# Patient Record
Sex: MALE | Race: WHITE
[De-identification: names, ages, dates, MRNs, and addresses within clinical notes are randomized per-mention and may not be internally consistent; named-entity substitution may affect disease eponyms.]

---

## 2021-04-05 ENCOUNTER — HOSPITAL ENCOUNTER (EMERGENCY)
Dept: HOSPITAL 22 - UTC | Age: 36
Discharge: HOME | End: 2021-04-05
Payer: COMMERCIAL

## 2021-04-05 VITALS
TEMPERATURE: 98.06 F | SYSTOLIC BLOOD PRESSURE: 153 MMHG | DIASTOLIC BLOOD PRESSURE: 87 MMHG | OXYGEN SATURATION: 98 % | HEART RATE: 119 BPM | RESPIRATION RATE: 18 BRPM

## 2021-04-05 VITALS
DIASTOLIC BLOOD PRESSURE: 81 MMHG | TEMPERATURE: 98.3 F | OXYGEN SATURATION: 98 % | SYSTOLIC BLOOD PRESSURE: 149 MMHG | RESPIRATION RATE: 18 BRPM | HEART RATE: 87 BPM

## 2021-04-05 VITALS
SYSTOLIC BLOOD PRESSURE: 138 MMHG | DIASTOLIC BLOOD PRESSURE: 96 MMHG | OXYGEN SATURATION: 97 % | HEART RATE: 104 BPM | RESPIRATION RATE: 20 BRPM

## 2021-04-05 VITALS
HEART RATE: 118 BPM | OXYGEN SATURATION: 100 % | DIASTOLIC BLOOD PRESSURE: 112 MMHG | TEMPERATURE: 97.88 F | RESPIRATION RATE: 14 BRPM | SYSTOLIC BLOOD PRESSURE: 154 MMHG

## 2021-04-05 VITALS
OXYGEN SATURATION: 98 % | DIASTOLIC BLOOD PRESSURE: 83 MMHG | SYSTOLIC BLOOD PRESSURE: 142 MMHG | HEART RATE: 78 BPM | RESPIRATION RATE: 18 BRPM

## 2021-04-05 VITALS — BODY MASS INDEX: 35.9 KG/M2 | BODY MASS INDEX: 36.5 KG/M2

## 2021-04-05 DIAGNOSIS — I10: ICD-10-CM

## 2021-04-05 DIAGNOSIS — Z87.891: ICD-10-CM

## 2021-04-05 DIAGNOSIS — Z88.1: ICD-10-CM

## 2021-04-05 DIAGNOSIS — R20.2: ICD-10-CM

## 2021-04-05 DIAGNOSIS — M54.16: Primary | ICD-10-CM

## 2021-04-05 DIAGNOSIS — Z79.899: ICD-10-CM

## 2021-04-05 PROCEDURE — 72131 CT LUMBAR SPINE W/O DYE: CPT

## 2021-04-05 PROCEDURE — 93971 EXTREMITY STUDY: CPT

## 2021-04-05 PROCEDURE — 99282 EMERGENCY DEPT VISIT SF MDM: CPT

## 2021-04-30 ENCOUNTER — HOSPITAL ENCOUNTER (OUTPATIENT)
Dept: HOSPITAL 22 - PT | Age: 36
Discharge: HOME | End: 2021-04-30
Payer: COMMERCIAL

## 2021-04-30 DIAGNOSIS — M51.36: ICD-10-CM

## 2021-04-30 DIAGNOSIS — M47.16: Primary | ICD-10-CM

## 2021-04-30 PROCEDURE — 97163 PT EVAL HIGH COMPLEX 45 MIN: CPT

## 2021-04-30 PROCEDURE — 97010 HOT OR COLD PACKS THERAPY: CPT

## 2021-04-30 PROCEDURE — 97012 MECHANICAL TRACTION THERAPY: CPT

## 2021-04-30 PROCEDURE — 97014 ELECTRIC STIMULATION THERAPY: CPT

## 2021-04-30 PROCEDURE — G0283 ELEC STIM OTHER THAN WOUND: HCPCS

## 2021-04-30 PROCEDURE — 97110 THERAPEUTIC EXERCISES: CPT

## 2021-08-13 ENCOUNTER — HOSPITAL ENCOUNTER (OUTPATIENT)
Age: 36
End: 2021-08-13
Payer: COMMERCIAL

## 2021-08-13 VITALS — BODY MASS INDEX: 24.3 KG/M2

## 2021-08-13 DIAGNOSIS — M54.5: Primary | ICD-10-CM

## 2022-06-15 ENCOUNTER — OFFICE VISIT (OUTPATIENT)
Dept: FAMILY MEDICINE CLINIC | Facility: CLINIC | Age: 37
End: 2022-06-15

## 2022-06-15 VITALS
OXYGEN SATURATION: 98 % | HEIGHT: 69 IN | WEIGHT: 271 LBS | RESPIRATION RATE: 20 BRPM | BODY MASS INDEX: 40.14 KG/M2 | HEART RATE: 90 BPM | SYSTOLIC BLOOD PRESSURE: 140 MMHG | TEMPERATURE: 98.4 F | DIASTOLIC BLOOD PRESSURE: 95 MMHG

## 2022-06-15 DIAGNOSIS — L85.9 HYPERKERATOTIC HAND DERMATITIS: ICD-10-CM

## 2022-06-15 DIAGNOSIS — S39.012A STRAIN OF LUMBAR REGION, INITIAL ENCOUNTER: Primary | ICD-10-CM

## 2022-06-15 PROBLEM — E66.01 CLASS 3 SEVERE OBESITY DUE TO EXCESS CALORIES WITHOUT SERIOUS COMORBIDITY WITH BODY MASS INDEX (BMI) OF 40.0 TO 44.9 IN ADULT: Status: ACTIVE | Noted: 2022-06-15

## 2022-06-15 PROBLEM — E66.813 CLASS 3 SEVERE OBESITY DUE TO EXCESS CALORIES WITHOUT SERIOUS COMORBIDITY WITH BODY MASS INDEX (BMI) OF 40.0 TO 44.9 IN ADULT: Status: ACTIVE | Noted: 2022-06-15

## 2022-06-15 PROCEDURE — 99203 OFFICE O/P NEW LOW 30 MIN: CPT | Performed by: FAMILY MEDICINE

## 2022-06-15 RX ORDER — NAPROXEN 500 MG/1
TABLET ORAL
COMMUNITY
Start: 2022-04-04 | End: 2022-06-15 | Stop reason: SDUPTHER

## 2022-06-15 RX ORDER — CYCLOBENZAPRINE HCL 10 MG
10 TABLET ORAL 3 TIMES DAILY
Qty: 30 TABLET | Refills: 3 | Status: SHIPPED | OUTPATIENT
Start: 2022-06-15

## 2022-06-15 RX ORDER — SILDENAFIL 25 MG/1
25 TABLET, FILM COATED ORAL DAILY PRN
COMMUNITY
Start: 2022-05-13 | End: 2022-10-05 | Stop reason: SDUPTHER

## 2022-06-15 RX ORDER — CYCLOBENZAPRINE HCL 10 MG
TABLET ORAL
COMMUNITY
Start: 2022-04-04 | End: 2022-06-15 | Stop reason: SDUPTHER

## 2022-06-15 RX ORDER — NAPROXEN 500 MG/1
500 TABLET ORAL 2 TIMES DAILY WITH MEALS
Qty: 60 TABLET | Refills: 3 | Status: SHIPPED | OUTPATIENT
Start: 2022-06-15

## 2022-06-15 NOTE — PROGRESS NOTES
"Chief Complaint   Patient presents with   • NP / establish care    • Hypertension     Pt unsure of the name of his medication, he thinks it's Lisnopril 10mg    • low to mid back pain      Flared up again yesterday        Subjective      Matthew Zhou is a 36 y.o. who presents for recurrence of low back pain and lumbar strain. IN the past patient has had radicular symptoms. Pain started yesterday in left lower back after being twisted in an awkward position for a long period of time. Pain is non radiating. He is out of NSAIDS and muscle relaxants. In the past when pain is severe he has required prn Percocet.     He also has hand dermatitis that is unresponsive to topicals. He has seen [facility] in the past and did well with Dupixent     Objective   Vital Signs:  /95   Pulse 90   Temp 98.4 °F (36.9 °C)   Resp 20   Ht 175.3 cm (69\")   Wt 123 kg (271 lb)   SpO2 98%   BMI 40.02 kg/m²     Class 3 Severe Obesity (BMI >=40). Obesity-related health conditions include the following: none. Obesity is unchanged. BMI is is above average; BMI management plan is completed. We discussed portion control and increasing exercise.        Physical Exam  Musculoskeletal:      Lumbar back: Spasms and tenderness present. No swelling, edema, deformity, signs of trauma, lacerations or bony tenderness. Decreased range of motion. Negative right straight leg raise test and negative left straight leg raise test. No scoliosis.        Back:       Comments: TTP left lower myofascia   Skin:     Comments: Thickened hyperkeratotic skin of palms          Result Review                     Assessment and Plan  Diagnoses and all orders for this visit:    1. Strain of lumbar region, initial encounter (Primary)  -     cyclobenzaprine (FLEXERIL) 10 MG tablet; Take 1 tablet by mouth 3 (Three) Times a Day.  Dispense: 30 tablet; Refill: 3  -     naproxen (NAPROSYN) 500 MG tablet; Take 1 tablet by mouth 2 (Two) Times a Day With Meals.  Dispense: 60 " tablet; Refill: 3    2. Hyperkeratotic hand dermatitis  -     Ambulatory Referral to Dermatology            Follow Up  Return if symptoms worsen or fail to improve.  Patient was given instructions and counseling regarding his condition or for health maintenance advice. Please see specific information pulled into the AVS if appropriate.

## 2022-07-27 ENCOUNTER — OFFICE VISIT (OUTPATIENT)
Dept: FAMILY MEDICINE CLINIC | Facility: CLINIC | Age: 37
End: 2022-07-27

## 2022-07-27 VITALS
OXYGEN SATURATION: 98 % | DIASTOLIC BLOOD PRESSURE: 100 MMHG | HEART RATE: 101 BPM | HEIGHT: 69 IN | RESPIRATION RATE: 20 BRPM | TEMPERATURE: 98.2 F | SYSTOLIC BLOOD PRESSURE: 140 MMHG | WEIGHT: 272 LBS | BODY MASS INDEX: 40.29 KG/M2

## 2022-07-27 DIAGNOSIS — M51.36 DEGENERATIVE DISC DISEASE, LUMBAR: ICD-10-CM

## 2022-07-27 DIAGNOSIS — S39.012A STRAIN OF LUMBAR REGION, INITIAL ENCOUNTER: Primary | ICD-10-CM

## 2022-07-27 PROCEDURE — 99213 OFFICE O/P EST LOW 20 MIN: CPT | Performed by: FAMILY MEDICINE

## 2022-07-27 PROCEDURE — 96372 THER/PROPH/DIAG INJ SC/IM: CPT | Performed by: FAMILY MEDICINE

## 2022-07-27 RX ORDER — METHYLPREDNISOLONE 4 MG/1
TABLET ORAL
Qty: 21 TABLET | Refills: 0 | Status: SHIPPED | OUTPATIENT
Start: 2022-07-27

## 2022-07-27 RX ORDER — OXYCODONE HYDROCHLORIDE AND ACETAMINOPHEN 5; 325 MG/1; MG/1
1 TABLET ORAL EVERY 6 HOURS PRN
Qty: 15 TABLET | Refills: 0 | Status: SHIPPED | OUTPATIENT
Start: 2022-07-27

## 2022-07-27 RX ORDER — TRIAMCINOLONE ACETONIDE 40 MG/ML
40 INJECTION, SUSPENSION INTRA-ARTICULAR; INTRAMUSCULAR ONCE
Status: COMPLETED | OUTPATIENT
Start: 2022-07-27 | End: 2022-07-27

## 2022-07-27 RX ADMIN — TRIAMCINOLONE ACETONIDE 40 MG: 40 INJECTION, SUSPENSION INTRA-ARTICULAR; INTRAMUSCULAR at 11:16

## 2022-07-27 NOTE — PROGRESS NOTES
"Chief Complaint   Patient presents with   • low back pain / spasm      Started this morning / leaves for vacation in the morning        Subjective      Matthew Zhou is a 36 y.o. who presents for acute onset low back pain. This is a recurring problem that patient states is happening more frequently. He has a history of lumbar disc disease and displacement. Today pain began after picking up an object. Pain is localized to the low back. No radiculopathy or leg weakness.    The following portions of the patient's history were reviewed and updated as appropriate: current medications, past medical history and problem list.    Review of Systems    Objective   Vital Signs:  /100   Pulse 101   Temp 98.2 °F (36.8 °C)   Resp 20   Ht 175.3 cm (69.02\")   Wt 123 kg (272 lb)   SpO2 98%   BMI 40.15 kg/m²     Class 3 Severe Obesity (BMI >=40). Obesity-related health conditions include the following: Low back pain. Obesity is worsening. BMI is is above average; BMI management plan is completed. We discussed portion control.        Physical Exam  Constitutional:       General: He is in acute distress.      Appearance: He is not ill-appearing.   Musculoskeletal:      Lumbar back: Spasms and tenderness present. No swelling, edema, deformity, signs of trauma or bony tenderness. Decreased range of motion. Negative right straight leg raise test and negative left straight leg raise test.      Comments: TTP Right lumbar paraspinals and QL. Pain with lumbar extension   Neurological:      Mental Status: He is alert.          Result Review                     Assessment and Plan  Diagnoses and all orders for this visit:    1. Strain of lumbar region, initial encounter (Primary)  -     oxyCODONE-acetaminophen (Percocet) 5-325 MG per tablet; Take 1 tablet by mouth Every 6 (Six) Hours As Needed for Severe Pain .  Dispense: 15 tablet; Refill: 0  -     triamcinolone acetonide (KENALOG-40) injection 40 mg    2. Degenerative disc " Gilda,    All of the labs were normal or acceptable.    Please contact my office if you have questions.    Gris Uribe M.D.       disease, lumbar  -     methylPREDNISolone (MEDROL) 4 MG dose pack; Take as directed on package instructions.  Dispense: 21 tablet; Refill: 0  -     oxyCODONE-acetaminophen (Percocet) 5-325 MG per tablet; Take 1 tablet by mouth Every 6 (Six) Hours As Needed for Severe Pain .  Dispense: 15 tablet; Refill: 0  -     Ambulatory Referral to Pain Management  -     triamcinolone acetonide (KENALOG-40) injection 40 mg            Follow Up  No follow-ups on file.  Patient was given instructions and counseling regarding his condition or for health maintenance advice. Please see specific information pulled into the AVS if appropriate.

## 2022-08-19 ENCOUNTER — HOSPITAL ENCOUNTER (OUTPATIENT)
Age: 37
End: 2022-08-19
Payer: COMMERCIAL

## 2022-08-19 VITALS
OXYGEN SATURATION: 100 % | TEMPERATURE: 98.42 F | DIASTOLIC BLOOD PRESSURE: 98 MMHG | SYSTOLIC BLOOD PRESSURE: 147 MMHG | RESPIRATION RATE: 18 BRPM | HEART RATE: 103 BPM

## 2022-08-19 VITALS — BODY MASS INDEX: 36.6 KG/M2

## 2022-08-19 DIAGNOSIS — M54.50: Primary | ICD-10-CM

## 2022-08-19 PROCEDURE — G0463 HOSPITAL OUTPT CLINIC VISIT: HCPCS

## 2022-08-19 PROCEDURE — 99202 OFFICE O/P NEW SF 15 MIN: CPT

## 2022-10-03 RX ORDER — LISINOPRIL 10 MG/1
TABLET ORAL
Qty: 30 TABLET | Refills: 5 | Status: SHIPPED | OUTPATIENT
Start: 2022-10-03

## 2022-10-05 NOTE — TELEPHONE ENCOUNTER
Caller: Matthew Zhou    Relationship: Self    Best call back number: [phone]    Requested Prescriptions:   Requested Prescriptions     Pending Prescriptions Disp Refills   • sildenafil (VIAGRA) 25 MG tablet       Sig: Take 1 tablet by mouth Daily As Needed.        Pharmacy where request should be sent: [id number] - [place], [place] - [address] AT [address] [facility] - [phone]  - [phone] FX     Additional details provided by patient: PATIENT CALLED TO GET A REFILL FROM THE PHARMACY BUT THE AUTOMATED SYSTEM STATED THE DESCRIPTION NUMBER DID NOT MATCH. PATIENT IS OUT OF THIS MEDICATION AND NEEDS A REFILL ASAP     Does the patient have less than a 3 day supply:  [x] Yes  [] No    Sincere Vega Rep   10/05/22 08:22 EDT

## 2022-10-06 RX ORDER — SILDENAFIL 25 MG/1
25 TABLET, FILM COATED ORAL DAILY PRN
Qty: 30 TABLET | Refills: 5 | Status: SHIPPED | OUTPATIENT
Start: 2022-10-06

## 2024-02-14 RX ORDER — LISINOPRIL 10 MG/1
TABLET ORAL
Qty: 30 TABLET | Refills: 5 | OUTPATIENT
Start: 2024-02-14

## 2024-05-12 ENCOUNTER — HOSPITAL ENCOUNTER (EMERGENCY)
Age: 39
Discharge: HOME | End: 2024-05-12
Payer: COMMERCIAL

## 2024-05-12 VITALS
TEMPERATURE: 98.24 F | DIASTOLIC BLOOD PRESSURE: 68 MMHG | OXYGEN SATURATION: 97 % | RESPIRATION RATE: 16 BRPM | HEART RATE: 82 BPM | SYSTOLIC BLOOD PRESSURE: 110 MMHG

## 2024-05-12 VITALS — OXYGEN SATURATION: 97 % | DIASTOLIC BLOOD PRESSURE: 81 MMHG | HEART RATE: 126 BPM | SYSTOLIC BLOOD PRESSURE: 118 MMHG

## 2024-05-12 VITALS
DIASTOLIC BLOOD PRESSURE: 86 MMHG | HEART RATE: 90 BPM | SYSTOLIC BLOOD PRESSURE: 118 MMHG | RESPIRATION RATE: 20 BRPM | TEMPERATURE: 98.3 F | OXYGEN SATURATION: 97 %

## 2024-05-12 VITALS — BODY MASS INDEX: 38.7 KG/M2

## 2024-05-12 VITALS
DIASTOLIC BLOOD PRESSURE: 82 MMHG | TEMPERATURE: 98.24 F | RESPIRATION RATE: 20 BRPM | SYSTOLIC BLOOD PRESSURE: 173 MMHG | OXYGEN SATURATION: 98 % | HEART RATE: 131 BPM

## 2024-05-12 VITALS — SYSTOLIC BLOOD PRESSURE: 110 MMHG | HEART RATE: 103 BPM | OXYGEN SATURATION: 97 % | DIASTOLIC BLOOD PRESSURE: 68 MMHG

## 2024-05-12 VITALS
OXYGEN SATURATION: 97 % | DIASTOLIC BLOOD PRESSURE: 71 MMHG | HEART RATE: 107 BPM | TEMPERATURE: 98.2 F | SYSTOLIC BLOOD PRESSURE: 123 MMHG | RESPIRATION RATE: 20 BRPM

## 2024-05-12 VITALS — HEART RATE: 102 BPM | SYSTOLIC BLOOD PRESSURE: 123 MMHG | DIASTOLIC BLOOD PRESSURE: 71 MMHG | OXYGEN SATURATION: 96 %

## 2024-05-12 DIAGNOSIS — M54.42: Primary | ICD-10-CM

## 2024-05-12 DIAGNOSIS — M62.830: ICD-10-CM

## 2024-05-12 DIAGNOSIS — M53.86: Primary | ICD-10-CM

## 2024-05-12 PROCEDURE — 99283 EMERGENCY DEPT VISIT LOW MDM: CPT

## 2024-05-12 PROCEDURE — 96374 THER/PROPH/DIAG INJ IV PUSH: CPT

## 2024-05-12 PROCEDURE — 99284 EMERGENCY DEPT VISIT MOD MDM: CPT

## 2024-05-12 PROCEDURE — 96375 TX/PRO/DX INJ NEW DRUG ADDON: CPT

## 2024-05-12 PROCEDURE — 96372 THER/PROPH/DIAG INJ SC/IM: CPT

## 2024-05-13 ENCOUNTER — HOSPITAL ENCOUNTER (EMERGENCY)
Facility: HOSPITAL | Age: 39
Discharge: HOME | End: 2024-05-13
Attending: EMERGENCY MEDICINE | Admitting: EMERGENCY MEDICINE
Payer: COMMERCIAL

## 2024-05-13 ENCOUNTER — TELEPHONE (OUTPATIENT)
Dept: FAMILY MEDICINE CLINIC | Facility: CLINIC | Age: 39
End: 2024-05-13
Payer: COMMERCIAL

## 2024-05-13 VITALS
HEIGHT: 68 IN | TEMPERATURE: 98.3 F | BODY MASS INDEX: 40.92 KG/M2 | OXYGEN SATURATION: 96 % | RESPIRATION RATE: 18 BRPM | SYSTOLIC BLOOD PRESSURE: 172 MMHG | WEIGHT: 270 LBS | DIASTOLIC BLOOD PRESSURE: 108 MMHG | HEART RATE: 99 BPM

## 2024-05-13 DIAGNOSIS — M54.30 ACUTE SCIATICA: Primary | ICD-10-CM

## 2024-05-13 PROCEDURE — 99283 EMERGENCY DEPT VISIT LOW MDM: CPT

## 2024-05-13 PROCEDURE — 25010000002 HYDROMORPHONE 1 MG/ML SOLUTION: Performed by: EMERGENCY MEDICINE

## 2024-05-13 PROCEDURE — 96372 THER/PROPH/DIAG INJ SC/IM: CPT

## 2024-05-13 PROCEDURE — 63710000001 ONDANSETRON ODT 4 MG TABLET DISPERSIBLE: Performed by: EMERGENCY MEDICINE

## 2024-05-13 RX ORDER — PREDNISONE 20 MG/1
TABLET ORAL
Qty: 18 TABLET | Refills: 0 | Status: SHIPPED | OUTPATIENT
Start: 2024-05-13

## 2024-05-13 RX ORDER — ONDANSETRON 4 MG/1
4 TABLET, ORALLY DISINTEGRATING ORAL ONCE
Status: COMPLETED | OUTPATIENT
Start: 2024-05-13 | End: 2024-05-13

## 2024-05-13 RX ADMIN — ONDANSETRON 4 MG: 4 TABLET, ORALLY DISINTEGRATING ORAL at 15:15

## 2024-05-13 RX ADMIN — HYDROMORPHONE HYDROCHLORIDE 1 MG: 1 INJECTION, SOLUTION INTRAMUSCULAR; INTRAVENOUS; SUBCUTANEOUS at 15:15

## 2024-05-13 NOTE — TELEPHONE ENCOUNTER
Caller: CHAPARRO SANTORO    Relationship: Emergency Contact    Best call back number: [phone]     What is the best time to reach you: ANY    Who are you requesting to speak with (clinical staff, provider,  specific staff member): CLINICAL STAFF    Do you know the name of the person who called: CHAPARRO    What was the call regarding:   PATIENT'S WIFE IS REQUESTING FOR DR MICHAEL TO PLACE A STAT ORDER FOR AN MRI TO IMAGE HIS LEG(S).     THEY ARE CURRENTLY IN THE EMERGENCY ROOM AND WERE ADVISED BY THE HOSPITAL DOCTOR THAT THEY CAN ONLY DO URGENT MRI'S FOR SERIOUS SITUATIONS SUCH AS FOR STROKE VICTIMS.    SHE STATED THAT THEY TRAVELLED FROM [place] AND WOULD LIKE TO HAVE THIS PERFORMED TODAY IF AT ALL POSSIBLE.    PLEASE ADVISE

## 2024-05-13 NOTE — ED PROVIDER NOTES
Subjective   History of Present Illness  30-year-old male presents emergency department today with a left sciatica.  Patient reports has a longstanding history of back problems and usually sees a chiropractor and has seen Dr. Acevedo in the past but never had to have surgery.  He had MRIs in the past and was hoping to get an MRI today because over the past 24 to 40 hours his pain has gotten worse.  He had no fall no trauma.  Denies any loss of bowel or bladder control.  No saddle anesthesia.  Denies any dysuria frequency urgency or hematuria.    History provided by:  Patient   used: No    Sciatica  Location:  Left buttock rating down the left leg.  Quality:  Shooting burning  Severity:  Severe  Duration:  3 days  Timing:  Constant  Chronicity:  Recurrent  Context:  Longstanding history of back problems previous sciatica.  No previous surgeries.  Relieved by:  Lying flat almost pain-free.  Worsened by:  Sitting up greatly exacerbates his pain.  Associated symptoms: no abdominal pain, no chest pain, no congestion, no cough, no diarrhea, no ear pain, no fatigue, no fever, no headaches, no loss of consciousness, no myalgias, no nausea, no rash, no rhinorrhea, no sore throat, no vomiting and no wheezing        Review of Systems   Constitutional:  Negative for fatigue and fever.   HENT:  Negative for congestion, ear pain, rhinorrhea and sore throat.    Respiratory:  Negative for cough and wheezing.    Cardiovascular:  Negative for chest pain.   Gastrointestinal:  Negative for abdominal pain, diarrhea, nausea and vomiting.   Musculoskeletal:  Negative for myalgias.   Skin:  Negative for rash.   Neurological:  Negative for loss of consciousness and headaches.       No past medical history on file.    No Known Allergies    No past surgical history on file.    No family history on file.    Social History     Socioeconomic History   • Marital status:    Tobacco Use   • Smoking status: Former     Current  packs/day: 0.00     Types: Cigarettes     Quit date: 2014     Years since quitting: 10.3   • Smokeless tobacco: Never   Vaping Use   • Vaping status: Never Used   Substance and Sexual Activity   • Alcohol use: Yes   • Drug use: Never   • Sexual activity: Defer           Objective   Physical Exam  Vitals and nursing note reviewed.   Constitutional:       Appearance: He is well-developed.   HENT:      Head: Normocephalic and atraumatic.      Right Ear: External ear normal.      Left Ear: External ear normal.      Nose: Nose normal.   Eyes:      General: No scleral icterus.     Conjunctiva/sclera: Conjunctivae normal.   Neck:      Thyroid: No thyromegaly.   Pulmonary:      Effort: Pulmonary effort is normal. No respiratory distress.   Abdominal:      Palpations: Abdomen is soft.   Musculoskeletal:      Cervical back: Normal range of motion.      Comments: Left sciatica tender there is no rash no lesions.  Positive straight leg raise on the left.  Strength 5/5.  Skin is warm pink and dry.   Lymphadenopathy:      Cervical: No cervical adenopathy.   Skin:     General: Skin is warm and dry.   Neurological:      Mental Status: He is alert and oriented to person, place, and time.      Cranial Nerves: No cranial nerve deficit.      Coordination: Coordination normal.      Deep Tendon Reflexes: Reflexes are normal and symmetric. Reflexes normal.   Psychiatric:         Behavior: Behavior normal.         Thought Content: Thought content normal.         Judgment: Judgment normal.       Procedures           ED Course  ED Course as of 05/13/24 1454   Mon May 13, 2024   1453 Is requesting an MRI.  We discussed he did not read the emergent medical needs for an MRI in the emergency department.  He will be given a shot of Dilaudid here.  Started on a tapering dose of prednisone.  He has muscle relaxers at home.  Advised him to follow-up with his primary care or Dr. Acevedo so they can get an outpatient MRI scheduled. [RANDAL]      ED Course  User Index  [RANDAL] Angel Mendes PA                                             Medical Decision Making  Risk  Prescription drug management.        Final diagnoses:   Acute sciatica       ED Disposition  ED Disposition       ED Disposition   Discharge    Condition   Stable    Comment   --               Farhad Aviles MD  [address] [facility] [address] [phone]      Call for appointment         Medication List        New Prescriptions      predniSONE 20 MG tablet  Commonly known as: DELTASONE  60 mg p.o. daily for 3 days then 40 mg p.o. daily for 3 days then 20 mg p.o. daily for 3 days               Where to Get Your Medications        These medications were sent to [facility] - [place] - [address] [place] [address] [address] [facility] [address] - [phone]  - [phone] [place] [address] [address] [facility], [place] [address]      Phone: [phone]   predniSONE 20 MG tablet            Angel Mendes PA  05/13/24 9240

## 2024-08-22 ENCOUNTER — HOSPITAL ENCOUNTER (EMERGENCY)
Age: 39
Discharge: HOME | End: 2024-08-22
Payer: COMMERCIAL

## 2024-08-22 VITALS
DIASTOLIC BLOOD PRESSURE: 96 MMHG | OXYGEN SATURATION: 99 % | RESPIRATION RATE: 20 BRPM | HEART RATE: 99 BPM | SYSTOLIC BLOOD PRESSURE: 155 MMHG | TEMPERATURE: 98.42 F

## 2024-08-22 VITALS
SYSTOLIC BLOOD PRESSURE: 155 MMHG | TEMPERATURE: 98.42 F | RESPIRATION RATE: 18 BRPM | HEART RATE: 99 BPM | OXYGEN SATURATION: 99 % | DIASTOLIC BLOOD PRESSURE: 96 MMHG

## 2024-08-22 VITALS — BODY MASS INDEX: 38 KG/M2

## 2024-08-22 DIAGNOSIS — R07.0: ICD-10-CM

## 2024-08-22 DIAGNOSIS — J02.0: Primary | ICD-10-CM

## 2024-08-22 DIAGNOSIS — R06.2: ICD-10-CM

## 2024-08-22 DIAGNOSIS — R05.9: ICD-10-CM

## 2024-08-22 DIAGNOSIS — J20.9: ICD-10-CM

## 2024-08-22 PROCEDURE — 99212 OFFICE O/P EST SF 10 MIN: CPT

## 2024-08-22 PROCEDURE — 96372 THER/PROPH/DIAG INJ SC/IM: CPT

## 2024-08-22 PROCEDURE — G0463 HOSPITAL OUTPT CLINIC VISIT: HCPCS

## 2024-08-22 PROCEDURE — 87880 STREP A ASSAY W/OPTIC: CPT

## 2024-08-22 PROCEDURE — 99204 OFFICE O/P NEW MOD 45 MIN: CPT

## 2025-06-05 NOTE — Clinical Note
[facility] EMERGENCY DEPARTMENT  [address]  [facility] [address]  Phone: [phone]    Matthew Zhou was seen and treated in our emergency department on 5/13/2024.  He may return to work on 05/16/2024.         Thank you for choosing [facility].    Angel Mendes PA      
no

## 2025-07-25 ENCOUNTER — OFFICE VISIT (OUTPATIENT)
Dept: FAMILY MEDICINE CLINIC | Facility: CLINIC | Age: 40
End: 2025-07-25
Payer: COMMERCIAL

## 2025-07-25 VITALS
HEART RATE: 95 BPM | BODY MASS INDEX: 40.69 KG/M2 | OXYGEN SATURATION: 99 % | SYSTOLIC BLOOD PRESSURE: 140 MMHG | WEIGHT: 284.2 LBS | RESPIRATION RATE: 20 BRPM | HEIGHT: 70 IN | TEMPERATURE: 98.1 F | DIASTOLIC BLOOD PRESSURE: 82 MMHG

## 2025-07-25 DIAGNOSIS — G47.8 UNREFRESHED BY SLEEP: ICD-10-CM

## 2025-07-25 DIAGNOSIS — I10 PRIMARY HYPERTENSION: ICD-10-CM

## 2025-07-25 DIAGNOSIS — G47.10 HYPERSOMNOLENCE: Primary | ICD-10-CM

## 2025-07-25 DIAGNOSIS — R06.83 LOUD SNORING: ICD-10-CM

## 2025-07-25 DIAGNOSIS — E66.813 CLASS 3 SEVERE OBESITY DUE TO EXCESS CALORIES WITHOUT SERIOUS COMORBIDITY WITH BODY MASS INDEX (BMI) OF 40.0 TO 44.9 IN ADULT: ICD-10-CM

## 2025-07-25 RX ORDER — GABAPENTIN 400 MG/1
CAPSULE ORAL
COMMUNITY

## 2025-07-25 RX ORDER — LISINOPRIL 10 MG/1
10 TABLET ORAL DAILY
Qty: 30 TABLET | Refills: 5 | Status: SHIPPED | OUTPATIENT
Start: 2025-07-25

## 2025-07-25 RX ORDER — CYCLOBENZAPRINE HCL 5 MG
TABLET ORAL
COMMUNITY

## 2025-07-25 NOTE — PROGRESS NOTES
"Chief Complaint   Patient presents with    Daytime Sleepiness    Fatigue    Re-establish care     Hypertension     Pt has not had his BP meds for two months.        Subjective      Matthew Zhou is a 39 y.o. who presents for a visit to reestablish care with concerns over lack of energy and hypersomnolence.  He saw his previous PCP 2 months ago and reports normal labs.  He has been out of his antihypertensives for the last 2 months.  Patient will sleep anywhere from 5-1/2 hours per night during the week to 12 hours on the weekend.  Regardless of the amount of sleep he gets he never feels rested.  He snores loudly but his wife has not witnessed any apnea.  Symptoms have progressed so severely that he has had trouble staying awake while driving stopped at a red light.  He has also noticed a significant decline in his desire to have sex as well as his ability to obtain an erection.  Patient's father had sleep apnea.  He recognizes that he carries excess weight and has recently begun to make dietary changes primarily in the form of nonsugar he drinks in a way to lose weight.    The following portions of the patient's history were reviewed and updated as appropriate: allergies, current medications, past family history, past medical history, past social history, past surgical history, and problem list.    Review of Systems    Objective   Vital Signs:  /82   Pulse 95   Temp 98.1 °F (36.7 °C)   Resp 20   Ht 177.8 cm (70\")   Wt 129 kg (284 lb 3.2 oz)   SpO2 99%   BMI 40.78 kg/m²             Physical Exam  Constitutional:       Appearance: Normal appearance.   HENT:      Head: Normocephalic and atraumatic.      Right Ear: Tympanic membrane and ear canal normal.      Left Ear: Tympanic membrane and ear canal normal.      Nose: Nose normal.      Mouth/Throat:      Mouth: Mucous membranes are moist.      Pharynx: Oropharynx is clear.      Comments: Mallampati 3  Eyes:      Conjunctiva/sclera: Conjunctivae normal. "   Neck:      Comments: Neck is 21 inches in circumference  Cardiovascular:      Rate and Rhythm: Normal rate and regular rhythm.      Heart sounds: Normal heart sounds. No murmur heard.  Pulmonary:      Effort: Pulmonary effort is normal. No respiratory distress.      Breath sounds: Normal breath sounds.   Musculoskeletal:      Cervical back: Normal range of motion and neck supple. No tenderness.   Lymphadenopathy:      Cervical: No cervical adenopathy.   Skin:     General: Skin is warm and dry.   Neurological:      Mental Status: He is alert.   Psychiatric:         Mood and Affect: Mood normal.          Result Review   The following data was reviewed by: Farhad Aviels MD on 07/25/2025:    Data reviewed : Stop-Bang-6, ESS-17              Assessment and Plan  Diagnoses and all orders for this visit:    1. Hypersomnolence (Primary)  -     Home Sleep Study; Future  -     Ambulatory Referral to Sleep Medicine    2. Unrefreshed by sleep  -     Home Sleep Study; Future  -     Ambulatory Referral to Sleep Medicine    3. Loud snoring  -     Home Sleep Study; Future  -     Ambulatory Referral to Sleep Medicine    4. Primary hypertension  -     lisinopril (PRINIVIL,ZESTRIL) 10 MG tablet; Take 1 tablet by mouth Daily.  Dispense: 30 tablet; Refill: 5    5. Class 3 severe obesity due to excess calories without serious comorbidity with body mass index (BMI) of 40.0 to 44.9 in adult    Plan  1.  Patient has signs and symptoms of obstructive sleep apnea.  He will be referred to [facility] sleep specialist Dr. Maryan Riggs and we will attempt to order a home sleep study through [facility] as well prior to that visit.  Long discussion was had with the patient educating him on the condition and the preferred forms of treatment.  It is likely that CPAP is in his future which would be managed by sleep medicine.  Zepbound is also an FDA approved treatment for his sleep apnea that would also aid with weight loss.  Once  he has a diagnosis I can prescribe this medication in addition to dietary changes which she is already working on.  Patient would be willing to pay out-of-pocket if this is not covered by his insurance  2.  Blood pressure is not controlled in office today due to lack of medication and he will resume his lisinopril 10 mg.  Reassess in 3 months.  Treatment of DIMITRI will likely improve his sleep apnea  3.  Patient has class III obesity with multiple weight related comorbidities including suspected DIMITRI and hypertension.  He will continue making dietary changes for weight.  We discussed use of medications such as GLP-1 agonist which would be most beneficial as well as consideration of bariatric surgery.  Patient will return in 3 months for reassessment        Follow Up  Return in about 3 months (around 10/25/2025) for Next scheduled follow up.  Patient was given instructions and counseling regarding his condition or for health maintenance advice. Please see specific information pulled into the AVS if appropriate.